# Patient Record
Sex: FEMALE | Race: WHITE
[De-identification: names, ages, dates, MRNs, and addresses within clinical notes are randomized per-mention and may not be internally consistent; named-entity substitution may affect disease eponyms.]

---

## 2018-09-20 ENCOUNTER — HOSPITAL ENCOUNTER (EMERGENCY)
Dept: HOSPITAL 91 - FTE | Age: 38
Discharge: HOME | End: 2018-09-20
Payer: MEDICAID

## 2018-09-20 ENCOUNTER — HOSPITAL ENCOUNTER (EMERGENCY)
Age: 38
Discharge: HOME | End: 2018-09-20

## 2018-09-20 DIAGNOSIS — R05: ICD-10-CM

## 2018-09-20 DIAGNOSIS — Z77.098: ICD-10-CM

## 2018-09-20 DIAGNOSIS — R07.9: Primary | ICD-10-CM

## 2018-09-20 PROCEDURE — 81025 URINE PREGNANCY TEST: CPT

## 2018-09-20 PROCEDURE — 93005 ELECTROCARDIOGRAM TRACING: CPT

## 2018-09-20 PROCEDURE — 99284 EMERGENCY DEPT VISIT MOD MDM: CPT

## 2018-09-20 PROCEDURE — 71045 X-RAY EXAM CHEST 1 VIEW: CPT

## 2019-06-21 ENCOUNTER — HOSPITAL ENCOUNTER (EMERGENCY)
Dept: HOSPITAL 10 - E/R | Age: 39
LOS: 1 days | Discharge: HOME | End: 2019-06-22
Payer: MEDICAID

## 2019-06-21 ENCOUNTER — HOSPITAL ENCOUNTER (EMERGENCY)
Dept: HOSPITAL 91 - E/R | Age: 39
LOS: 1 days | Discharge: HOME | End: 2019-06-22
Payer: MEDICAID

## 2019-06-21 VITALS — RESPIRATION RATE: 16 BRPM | DIASTOLIC BLOOD PRESSURE: 74 MMHG | SYSTOLIC BLOOD PRESSURE: 152 MMHG | HEART RATE: 92 BPM

## 2019-06-21 VITALS
HEIGHT: 61 IN | HEIGHT: 61 IN | WEIGHT: 126.77 LBS | BODY MASS INDEX: 23.93 KG/M2 | BODY MASS INDEX: 23.93 KG/M2 | WEIGHT: 126.77 LBS

## 2019-06-21 DIAGNOSIS — J20.9: Primary | ICD-10-CM

## 2019-06-21 PROCEDURE — 99283 EMERGENCY DEPT VISIT LOW MDM: CPT

## 2019-06-22 NOTE — ERD
ER Documentation


Chief Complaint


Chief Complaint





cough w/ CWP x2 weeks





HPI


38-year-old female who presents to the emergency room with approximately 2 to 3 


weeks of cough.  Cough is dry nonproductive.  The patient has chest wall pain 


with coughing.  No fever during this timeframe.  Persistent symptoms despite the


use of over-the-counter medications.  No history of asthma or smoking.  She 


denies exertional symptoms.  No leg swelling.  Symptoms are moderate.





ROS


All systems reviewed and are negative except as per history of present illness.





Medications


Home Meds


Active Scripts


[Hycodan 5mg/1.5mg]   No Conflict Check, 1 TAB PO TID PRN for COUGH, #12


   Prov:ADRIÁN MENDEZ MD         6/22/19


Azithromycin* (Zithromax*) 250 Mg Tablet, 250 MG PO .ZPACK AS DIRECTED, #6 TAB


   TAKE 500 MG (2 TABS) THE FIRST DAY THEN 250 MG (1 TAB) DAYS 2-5


   Prov:ADRIÁN MENDEZ MD         6/22/19


Albuterol Sulfate* (Ventolin HFA*) 18 Gm Hfa.aer.ad, 2 PUFF INHALATION Q4H, #1 


INHALER


   Prov:ADRIÁN MENDEZ MD         6/22/19


Hydrocodone Bit-Acetaminophen* (Norco*) 5-325 Mg Tab, 1 TAB PO Q6 PRN for PAIN, 


#15 TAB


   Prov:GIO PRIDE         4/1/16


Sulfamethoxazole-Trimethoprim* (Bactrim* DS) 800-160 Mg Tab, 1 TAB PO BID for 14


Days, TAB


   Prov:GIO PRIDE         4/1/16





Allergies


Allergies:  


Coded Allergies:  


     No Known Drug Allergy (Verified  Allergy, Unknown, 4/1/16)





PMhx/Soc


History of Surgery:  Yes (APPENDECTOMY)


Anesthesia Reaction:  No


Hx Neurological Disorder:  No


Hx Respiratory Disorders:  No


Hx Cardiac Disorders:  No


Hx Psychiatric Problems:  No


Hx Miscellaneous Medical Probl:  Yes (ANEMIA)


Hx Alcohol Use:  No


Hx Substance Use:  No


Hx Tobacco Use:  No





FmHx


Family History:  No diabetes





Physical Exam


Vitals





Vital Signs


  Date      Temp  Pulse  Resp  B/P (MAP)   Pulse Ox  O2          O2 Flow    FiO2


Time                                                 Delivery    Rate


   6/21/19  98.1     92    16      152/74        98


     23:55                          (100)





Physical Exam


General: Well developed, well nourished, no acute distress


Head: Normocephalic, atraumatic.


Eyes: Pupils equally reactive, EOM intact


ENT: Moist mucous membranes


Neck: Supple, no lymphadenopathy


Respiratory: Lungs clear bilaterally, no distress


Cardiovascular: RRR, no murmurs, rubs, or gallops


Abdominal: Soft, non-tender, non-distended, no peritoneal signs


: Deferred


MSK: No edema, no unilateral swelling, 5/5 strength


Neurologic: Alert and oriented, moving all extremities, normal speech, no focal 


weakness, no cerebellar signs


Skin: No rash


Psych: Normal mood





Procedures/MDM


The patient's clinical presentation is very consistent with an acute viral 


syndrome versus atypical pneumonia.  Given duration of symptoms I believe a 


trial of inhaler, antibiotics would be appropriate.  I do not believe chest x-


ray imaging will be helpful at this time.  Low concern for malignancy.  Lungs 


are clear and oxygen saturation 100%.





The patient does not exhibit any clinical signs or symptoms concerning for 


serious bacterial infection or systemic illness. Based on history and clinical 


exam findings the patient does not appear to have evidence of pneumonia, strep 


pharyngitis, urinary tract infection, bacteremia, sepsis, or meningitis.





For these reasons I do not believe it is necessary to obtain laboratory testing 


or diagnostic imaging. I believe it would be appropriate for symptom control, 


and close outpatient primary care follow-up.





We discussed follow up with the patient's primary care doctor within 24 to 48 


hours as needed.  We also discussed return to the emergency room for worsening 


symptoms or worsening condition.





Discharge Medications:


Azithromycin, albuterol, Hycodan





Departure


Diagnosis:  


   Primary Impression:  


   Acute bronchitis


   Bronchitis organism:  unspecified organism  Qualified Codes:  J20.9 - Acute 


   bronchitis, unspecified


Condition:  Stable


Patient Instructions:  Acute Bronchitis


Referrals:  


[facility]


YOU HAVE RECEIVED A MEDICAL SCREENING EXAM AND THE RESULTS INDICATE THAT YOU DO 


NOT HAVE A CONDITION THAT REQUIRES URGENT TREATMENT IN THE EMERGENCY DEPARTMENT.





FURTHER EVALUATION AND TREATMENT OF YOUR CONDITION CAN WAIT UNTIL YOU ARE SEEN 


IN YOUR DOCTORS OFFICE WITHIN THE NEXT 1-2 DAYS. IT IS YOUR RESPONSIBILITY TO 


MAKE AN APPOINTMENT FOR FOLOW-UP CARE.





IF YOU HAVE A PRIMARY DOCTOR


--you should call your primary doctor and schedule an appointment





IF YOU DO NOT HAVE A PRIMARY DOCTOR YOU CAN CALL OUR PHYSICIAN REFERRAL HOTLINE 


AT


 [phone] 





IF YOU CAN NOT AFFORD TO SEE A PHYSICIAN YOU CAN CHOSE FROM THE FOLLOWING 


[facility] [phone] [address] [facility]





[facility] [phone] [address] [facility]. [facility]





[facility] [phone] [address] [facility] [phone] [address] [facility] [phone] [address] [facility]. [facility] [phone] [address] [facility]. [facility]


YOU HAVE RECEIVED A MEDICAL SCREENING EXAM AND THE RESULTS INDICATE THAT YOU DO 


NOT HAVE A CONDITION THAT REQUIRES URGENT TREATMENT IN THE EMERGENCY DEPARTMENT.





FURTHER EVALUATION AND TREATMENT OF YOUR CONDITION CAN WAIT UNTIL YOU ARE SEEN 


IN YOUR DOCTORS OFFICE WITHIN THE NEXT 1-2 DAYS. IT IS YOUR RESPONSIBILITY TO 


MAKE AN APPOINTMENT FOR FOLOW-UP CARE.





IF YOU HAVE A PRIMARY DOCTOR


--you should call your primary doctor and schedule and appointment





IF YOU DO NOT HAVE A PRIMARY DOCTOR YOU CAN CALL OUR PHYSICIAN REFERRAL HOTLINE 


AT [phone].





IF YOU CAN NOT AFFORD TO SEE A PHYSICIAN YOU CAN CHOSE FROM THE FOLLOWING [facility]


INSTITUTIONS:





[facility]


[address] [place] [address]





[facility]


[address] [place] [address]





[facility]


[address] [place] [address]





Additional Instructions:  


Call your primary care doctor TOMORROW for an appointment during the next 1 


WEEK.Tell the  that you were referred from this facility.See the doctor


sooner or return here if your  condition worsens before your appointment time.











ADRIÁN MENDEZ MD          Jun 22, 2019 01:34

## 2019-06-27 ENCOUNTER — HOSPITAL ENCOUNTER (EMERGENCY)
Dept: HOSPITAL 10 - FTE | Age: 39
Discharge: HOME | End: 2019-06-27
Payer: MEDICAID

## 2019-06-27 ENCOUNTER — HOSPITAL ENCOUNTER (EMERGENCY)
Dept: HOSPITAL 91 - FTE | Age: 39
Discharge: HOME | End: 2019-06-27
Payer: MEDICAID

## 2019-06-27 VITALS
HEIGHT: 61 IN | BODY MASS INDEX: 23.77 KG/M2 | WEIGHT: 125.88 LBS | BODY MASS INDEX: 23.77 KG/M2 | WEIGHT: 125.88 LBS | HEIGHT: 61 IN

## 2019-06-27 VITALS — DIASTOLIC BLOOD PRESSURE: 66 MMHG | SYSTOLIC BLOOD PRESSURE: 134 MMHG | HEART RATE: 95 BPM | RESPIRATION RATE: 18 BRPM

## 2019-06-27 DIAGNOSIS — R05: Primary | ICD-10-CM

## 2019-06-27 PROCEDURE — 99283 EMERGENCY DEPT VISIT LOW MDM: CPT

## 2019-06-27 PROCEDURE — 71045 X-RAY EXAM CHEST 1 VIEW: CPT

## 2019-06-27 NOTE — ERD
ER Documentation


Chief Complaint


Chief Complaint





COUGH X 1 WEEK





HPI


Patient is a 38-year-old female, no past medical history, who presents the ER 


for concerns of dry cough x1 week.  Patient was seen here 5 days ago and 


diagnosed of bronchitis.  Patient was given azithromycin and reports completing 


it.  Patient presents today given that she continues to have a cough.  Patient 


no fevers or chills.  Patient has no chest pain, shortness breath, nausea, 


vomiting, left upper extremity pain, diaphoresis or LOC.





ROS


All systems reviewed and are negative except as per history of present illness.





Medications


Home Meds


Active Scripts


Benzonatate* (Tessalon Perle*) 100 Mg Capsule, 100 MG PO Q8H PRN for COUGH, #30 


CAP


   Prov:TATA ALAMO PA-C         6/27/19


[Hycodan 5mg/1.5mg]   No Conflict Check, 1 TAB PO TID PRN for COUGH, #12


   Prov:ADRIÁN MENDEZ MD         6/22/19


Azithromycin* (Zithromax*) 250 Mg Tablet, 250 MG PO .ZPACK AS DIRECTED, #6 TAB


   TAKE 500 MG (2 TABS) THE FIRST DAY THEN 250 MG (1 TAB) DAYS 2-5


   Prov:ADRIÁN MENDEZ MD         6/22/19


Albuterol Sulfate* (Ventolin HFA*) 18 Gm Hfa.aer.ad, 2 PUFF INHALATION Q4H, #1 


INHALER


   Prov:ADRIÁN MENDEZ MD         6/22/19


Hydrocodone Bit-Acetaminophen* (Norco*) 5-325 Mg Tab, 1 TAB PO Q6 PRN for PAIN, 


#15 TAB


   Prov:GIO PRIDE         4/1/16


Sulfamethoxazole-Trimethoprim* (Bactrim* DS) 800-160 Mg Tab, 1 TAB PO BID for 14


Days, TAB


   Prov:GIO PRIDE         4/1/16





Allergies


Allergies:  


Coded Allergies:  


     No Known Drug Allergy (Verified  Allergy, Unknown, 4/1/16)





PMhx/Soc


History of Surgery:  Yes (Appedectomy)


Anesthesia Reaction:  No


Hx Neurological Disorder:  No


Hx Respiratory Disorders:  No


Hx Cardiac Disorders:  No


Hx Psychiatric Problems:  No


Hx Miscellaneous Medical Probl:  Yes (Anemia; takes iron )


Hx Alcohol Use:  No


Hx Substance Use:  No


Hx Tobacco Use:  No





FmHx


Family History:  No diabetes





Physical Exam


Vitals





Vital Signs


  Date      Temp  Pulse  Resp  B/P (MAP)   Pulse Ox  O2          O2 Flow    FiO2


Time                                                 Delivery    Rate


   6/27/19  99.0     81    18      134/72        98


     18:33                           (92)





Physical Exam


GENERAL: Well-developed, well-nourished female speaking in full sentences.  No 


acute distress.


HEAD: Normocephalic, atraumatic. 


EYES: Pupils are equally reactive bilaterally. EOMs grossly intact. No 


conjunctival erythema. 


ENT: Moist mucous membranes. No uvula deviation. No kissing tonsils. 


NECK: Supple. No meningismus. Normal range of motion of the neck.


LUNG: Clear to auscultation bilaterally. No rhonchi, wheezing, rales or coarse 


breath sounds. 


HEART: Regular rate and rhythm. No murmurs, rubs or gallops.


ABDOMEN: No scars, ecchymosis or rashes noted. Soft, nontender, and 


nondistended. Positive bowel sounds in all four quadrants. No rebound 


tenderness, no guarding. (-) McBurney's point tenderness. No CVA tenderness.


BACK: No midline tenderness. 


EXTREMITIES: Equal pulses bilaterally. No peripheral clubbing, cyanosis or 


edema. No unilateral leg swelling.


NEUROLOGIC: Alert and oriented. Moving all four extremities without any 


difficulty. Normal speech. Steady gait. 


SKIN: Normal color. Warm and dry. No rashes or lesions.





Procedures/MDM


MEDICAL DECISION MAKING:


This is a 38year-old female presents the ER for concerns of cough x3 weeks.  


Patient was seen here 5 days ago and given azithromycin for concerns of 


bronchitis.  Patient presents again given that she states she continues to have 


a cough after completing azithromycin.  Patient has no fevers.  Vital signs were


reviewed.  Patient was afebrile.  Patient was not hypoxic.  Lung exam was 


normal.  Chest x-ray was unremarkable.  Patient likely has a viral syndrome.  


Low suspicion for CHF, TB, pneumonia, meningitis, sinusitis, otitis externa, 


acute otitis media, strep pharyngitis, epiglottitis or peritonsillar abscess.  


Patient was nontoxic, non-ill-appearing prior to discharge.





PRESCRIPTIONS:


Tessalon Perles





DISCHARGE:


At this time, patient is stable for discharge and outpatient management. 


Supportive therapies such as OTC throat lozenges, salt water gurgles, popsicles 


and jello discussed. I have instructed the patient to follow-up with his/her 


primary care physician in 1-2 days. I have instructed the patient to promptly re


turn to the ER for any new or worsening symptoms including increased pain, 


swelling, fever, nausea, vomiting, weakness or difficulty breathing. The patient


and/or family expressed understanding of and agreement with this plan. All 


questions were answered. Home care instructions were provided. 


Disclaimer: Inadvertent spelling and grammatical errors are likely due to 


EHR/dictation software use and do not reflect on the overall quality of patient 


care. Also, please note that the electronic time recorded on this note does not 


necessarily reflect the actual time of the patient encounter.





Departure


Diagnosis:  


   Primary Impression:  


   Cough


Condition:  Fair


Patient Instructions:  Cough, Chronic, Uncertain Cause, (Adult)


Referrals:  


[facility]


YOU HAVE RECEIVED A MEDICAL SCREENING EXAM AND THE RESULTS INDICATE THAT YOU DO 


NOT HAVE A CONDITION THAT REQUIRES URGENT TREATMENT IN THE EMERGENCY DEPARTMENT.





FURTHER EVALUATION AND TREATMENT OF YOUR CONDITION CAN WAIT UNTIL YOU ARE SEEN 


IN YOUR DOCTORS OFFICE WITHIN THE NEXT 1-2 DAYS. IT IS YOUR RESPONSIBILITY TO 


MAKE AN APPOINTMENT FOR FOLOW-UP CARE.





IF YOU HAVE A PRIMARY DOCTOR


--you should call your primary doctor and schedule an appointment





IF YOU DO NOT HAVE A PRIMARY DOCTOR YOU CAN CALL OUR PHYSICIAN REFERRAL HOTLINE 


AT


 [phone] 





IF YOU CAN NOT AFFORD TO SEE A PHYSICIAN YOU CAN CHOSE FROM THE FOLLOWING 


[facility] [phone] [address] [facility]. [facility] [phone] [address] [facility]. [facility] [phone] [address] [facility]. [facility] [phone] [address] [facility]. [facility] [phone] [address] [facility]. [facility]. [phone] [address] [facility]. [facility]


YOU HAVE RECEIVED A MEDICAL SCREENING EXAM AND THE RESULTS INDICATE THAT YOU DO 


NOT HAVE A CONDITION THAT REQUIRES URGENT TREATMENT IN THE EMERGENCY DEPARTMENT.





FURTHER EVALUATION AND TREATMENT OF YOUR CONDITION CAN WAIT UNTIL YOU ARE SEEN 


IN YOUR DOCTORS OFFICE WITHIN THE NEXT 1-2 DAYS. IT IS YOUR RESPONSIBILITY TO 


MAKE AN APPOINTMENT FOR FOLOW-UP CARE.





IF YOU HAVE A PRIMARY DOCTOR


--you should call your primary doctor and schedule and appointment





IF YOU DO NOT HAVE A PRIMARY DOCTOR YOU CAN CALL OUR PHYSICIAN REFERRAL HOTLINE 


AT [phone].





IF YOU CAN NOT AFFORD TO SEE A PHYSICIAN YOU CAN CHOSE FROM THE FOLLOWING [facility]:





[facility]


[address] [place] [address]





[facility]


[address] [place] [address]





[facility]


[address] [place] [address]





Additional Instructions:  


Call your primary care doctor TOMORROW for an appointment during the next 1-2 


days.See the doctor sooner or return here if your condition worsens before your 


appointment time.











TATA ALAMO PA-C             Jun 27, 2019 20:05